# Patient Record
Sex: FEMALE | Race: WHITE | NOT HISPANIC OR LATINO | Employment: FULL TIME | URBAN - METROPOLITAN AREA
[De-identification: names, ages, dates, MRNs, and addresses within clinical notes are randomized per-mention and may not be internally consistent; named-entity substitution may affect disease eponyms.]

---

## 2017-04-19 ENCOUNTER — OFFICE VISIT (OUTPATIENT)
Dept: PODIATRY | Facility: CLINIC | Age: 72
End: 2017-04-19
Payer: COMMERCIAL

## 2017-04-19 ENCOUNTER — HOSPITAL ENCOUNTER (OUTPATIENT)
Dept: RADIOLOGY | Facility: HOSPITAL | Age: 72
Discharge: HOME | End: 2017-04-19
Attending: PODIATRIST
Payer: COMMERCIAL

## 2017-04-19 VITALS
WEIGHT: 194.88 LBS | DIASTOLIC BLOOD PRESSURE: 59 MMHG | HEART RATE: 66 BPM | BODY MASS INDEX: 34.53 KG/M2 | HEIGHT: 63 IN | SYSTOLIC BLOOD PRESSURE: 115 MMHG

## 2017-04-19 DIAGNOSIS — M79.672 BILATERAL FOOT PAIN: Primary | ICD-10-CM

## 2017-04-19 DIAGNOSIS — L84 CORN OR CALLUS: ICD-10-CM

## 2017-04-19 DIAGNOSIS — M79.671 BILATERAL FOOT PAIN: Primary | ICD-10-CM

## 2017-04-19 DIAGNOSIS — M20.42 HAMMERTOE OF LEFT FOOT: Primary | ICD-10-CM

## 2017-04-19 DIAGNOSIS — M79.671 BILATERAL FOOT PAIN: ICD-10-CM

## 2017-04-19 DIAGNOSIS — M79.672 BILATERAL FOOT PAIN: ICD-10-CM

## 2017-04-19 PROCEDURE — 1125F AMNT PAIN NOTED PAIN PRSNT: CPT | Mod: S$GLB,,, | Performed by: PODIATRIST

## 2017-04-19 PROCEDURE — 1159F MED LIST DOCD IN RCRD: CPT | Mod: S$GLB,,, | Performed by: PODIATRIST

## 2017-04-19 PROCEDURE — 73630 X-RAY EXAM OF FOOT: CPT | Mod: 50,TC,PO

## 2017-04-19 PROCEDURE — 99203 OFFICE O/P NEW LOW 30 MIN: CPT | Mod: S$GLB,,, | Performed by: PODIATRIST

## 2017-04-19 PROCEDURE — 99999 PR PBB SHADOW E&M-NEW PATIENT-LVL III: CPT | Mod: PBBFAC,,, | Performed by: PODIATRIST

## 2017-04-19 PROCEDURE — 1157F ADVNC CARE PLAN IN RCRD: CPT | Mod: 8P,S$GLB,, | Performed by: PODIATRIST

## 2017-04-19 PROCEDURE — 1160F RVW MEDS BY RX/DR IN RCRD: CPT | Mod: S$GLB,,, | Performed by: PODIATRIST

## 2017-04-19 PROCEDURE — 73630 X-RAY EXAM OF FOOT: CPT | Mod: 26,50,, | Performed by: RADIOLOGY

## 2017-04-19 RX ORDER — CELECOXIB 100 MG/1
CAPSULE ORAL
Refills: 3 | COMMUNITY
Start: 2017-02-23

## 2017-04-19 RX ORDER — DICLOFENAC SODIUM 10 MG/G
GEL TOPICAL
Refills: 3 | COMMUNITY
Start: 2017-04-02

## 2017-04-19 RX ORDER — RALOXIFENE HYDROCHLORIDE 60 MG/1
60 TABLET, FILM COATED ORAL
COMMUNITY
Start: 2016-11-14

## 2017-04-19 RX ORDER — HYDROXYCHLOROQUINE SULFATE 200 MG/1
200 TABLET, FILM COATED ORAL
COMMUNITY

## 2017-04-19 RX ORDER — CLOBETASOL PROPIONATE 0.5 MG/G
OINTMENT TOPICAL
Refills: 0 | COMMUNITY
Start: 2017-04-06

## 2017-04-19 RX ORDER — LEVOTHYROXINE SODIUM 100 UG/1
100 TABLET ORAL
COMMUNITY
Start: 2016-12-12

## 2017-04-19 RX ORDER — ERGOCALCIFEROL 1.25 MG/1
CAPSULE ORAL
COMMUNITY
Start: 2017-02-02

## 2017-04-19 RX ORDER — MUPIROCIN 20 MG/G
OINTMENT TOPICAL
Refills: 0 | COMMUNITY
Start: 2017-01-18

## 2017-04-19 RX ORDER — ATORVASTATIN CALCIUM 40 MG/1
TABLET, FILM COATED ORAL
COMMUNITY
Start: 2016-11-11

## 2017-04-19 RX ORDER — LOSARTAN POTASSIUM AND HYDROCHLOROTHIAZIDE 12.5; 5 MG/1; MG/1
TABLET ORAL
COMMUNITY
Start: 2017-04-03

## 2017-04-19 RX ORDER — ASPIRIN 81 MG/1
81 TABLET ORAL
COMMUNITY

## 2017-04-19 NOTE — PROGRESS NOTES
[facility] -   PODIATRIC MEDICINE AND SURGERY  PROGRESS NOTE  4/19/2017    PODIATRY NOTE  PCP: Dr. Ashley Shaver MD    CHIEF COMPLAINT   Chief Complaint   Patient presents with    Callouses     Medial side of left 2nd digit callous that is tender.       HPI  Nikolai Syed is a 71 y.o. female who has a past medical history of Arthritis; GERD (gastroesophageal reflux disease); and Hyperlipidemia.   Nikolai presents to clinic today complaining of left second toe pain.    Patient describes pain as:   Location:left second toe   Quality: achy  Severity:9/10  Duration: several years   Modifying Factors (Aggravating): shoewear, pressure from great toe   Modifying Factors (Alleviating): cushion, offloading     Patient denies other pedal complaints at this time.      PMH  Past Medical History:   Diagnosis Date    Arthritis     GERD (gastroesophageal reflux disease)     Hyperlipidemia        PROBLEM LIST  There are no active problems to display for this patient.      MEDS  No current outpatient prescriptions on file prior to visit.     No current facility-administered medications on file prior to visit.        Medication List with Changes/Refills   Current Medications    ASPIRIN (ECOTRIN) 81 MG EC TABLET    Take 81 mg by mouth.    ATORVASTATIN (LIPITOR) 40 MG TABLET    TAKE 1 TABLET DAILY    CELECOXIB (CELEBREX) 100 MG CAPSULE    TK 1 C PO D    CLOBETASOL 0.05% (TEMOVATE) 0.05 % OINT    APPLY TO AFFECTED AREA BID    DICLOFENAC SODIUM 1 % GEL    GUILLERMO 2 GRAMS TO AFFECTED  AREA QID    HYDROXYCHLOROQUINE (PLAQUENIL) 200 MG TABLET    Take 200 mg by mouth.    LEVOTHYROXINE (SYNTHROID) 100 MCG TABLET    Take 100 mcg by mouth.    LOSARTAN-HYDROCHLOROTHIAZIDE 50-12.5 MG (HYZAAR) 50-12.5 MG PER TABLET        MUPIROCIN (BACTROBAN) 2 % OINTMENT    GUILLERMO TO LOWER LIP WOUND D    RALOXIFENE (EVISTA) 60 MG TABLET    Take 60 mg by mouth.    RANITIDINE (ZANTAC) 300 MG TABLET    TAKE 1 TABLET NIGHTLY    VITAMIN D2 50,000 UNIT  "CAPSULE           PSH   History reviewed. No pertinent surgical history.     ALL  Review of patient's allergies indicates:   Allergen Reactions    Caffeine     Procaine Palpitations       SOC     Social History   Substance Use Topics    Smoking status: Never Smoker    Smokeless tobacco: None    Alcohol use None         FAMILY HX  History reviewed. No pertinent family history.         REVIEW OF SYSTEMS  General: Denies any fever or chills  Chest: Denies shortness of breath, wheezing, coughing, or sputum production  Heart: Denies chest pain, cold extremities, orthopenia, or reduced exercise tolerance  As noted above and per history of current illness above, otherwise negative in the remainder of the 14 systems.     PHYSICAL EXAM  Vitals:    04/19/17 0926   BP: (!) 115/59   Pulse: 66   Weight: 88.4 kg (194 lb 14.2 oz)   Height: 5' 3" (1.6 m)   PainSc:   9   PainLoc: Foot       General: This patient is well-developed, well-nourished and appears stated age, well-oriented to person, place and time, and cooperative and pleasant on today's visit      LOWER EXTREMITY  Vascular exam:   · Dorsalis pedis and posterior tibial pulses palpable 2/4 bilaterally.   · Capillary refill time immediate to the toes.   · Feet are warm to the touch. Skin temperature warm to warm from proximally to distally   · There are varicosities, telangiectasias noted to bilateral foot and ankle regions.   · There are no ecchymoses noted to bilateral foot and ankle regions.   · There is gross lower extremity edema.    Dermatologic exam:   · Skin moist with healthy texture and turgor.  · There are no open ulcerations, lacerations, or fissures to bilateral foot and ankle regions. There are no signs of infection as there is no erythema, no proximal-extending lymphangiitis, no fluctuance, or crepitus noted on palpation to bilateral foot and ankle regions.   · There is no interdigital maceration.   · There are hyperkeratotic lesions noted to medial " aspect of left second digit    Neurologic exam:  · Epicritic sensation is intact as the patient is able to sense light touch to bilateral foot and ankle regions.   · Achilles and patellar deep tendon reflexes intact  · Babinski reflex absent    Musculoskeletal/Orthopedic exam:   · Rigid digital contracture 2nd digit b/l  · Muscle strength AT/EHL/EDL/PT: 5/5; Achilles/Gastroc/Soleus: 5/5; PB/PL: 5/5 Muscle tone is normal.  · Ankle joint ROM  B/L limited DF/PF, non-crepitus  · STJ ROM supple inv/ev, non crepitus   · MTPJ b/l limited DF/PF, non crepitus  IMAGING   Reviewed by me and I agree with radiologist findings      ASSESSMENT  Hammertoe of left foot    Corn or callus      PLAN    1. Patient was educated about clinical and imaging findings, and verbalizes understanding of above.  2. Treatment plan: Reviewed findings and explained condition to the patient with visual reference to the foot and x-rays. I explained that hammertoe contractures may be inherited or acquired over time as a result of neurological or muscular soft tissue imbalances, external constriction on long toes, traumatic injuries, or arthritic type conditions. I explained that sometimes surgical intervention involving a soft tissue re-balancing/repair, osseous reconstruction, or a combination of both is the permanent solution, but conservative measures should be attempted first.     Provided patient with hammertoe  offloading toe silicone pad to reduce pressure, apply iodine ointment daily x 1 week and pad with band- aid. DC corn OTC topical. Patient was also guided on shoe gear selection of extra depth or larger toe boxed shoes.      3. RTC  for follow up/evaluation as scheduled       No future appointments.    Report Electronically Signed By:  Bita Calix DPM   Podiatric Medicine & Surgery  [facility]  4/19/2017